# Patient Record
Sex: FEMALE | Race: WHITE | NOT HISPANIC OR LATINO | URBAN - METROPOLITAN AREA
[De-identification: names, ages, dates, MRNs, and addresses within clinical notes are randomized per-mention and may not be internally consistent; named-entity substitution may affect disease eponyms.]

---

## 2022-04-20 ENCOUNTER — OFFICE (OUTPATIENT)
Dept: URBAN - METROPOLITAN AREA CLINIC 10 | Facility: CLINIC | Age: 82
End: 2022-04-20
Payer: COMMERCIAL

## 2022-04-20 VITALS
SYSTOLIC BLOOD PRESSURE: 120 MMHG | WEIGHT: 154 LBS | OXYGEN SATURATION: 88 % | DIASTOLIC BLOOD PRESSURE: 68 MMHG | HEIGHT: 67 IN | HEART RATE: 90 BPM

## 2022-04-20 DIAGNOSIS — K51.90 ULCERATIVE COLITIS, UNSPECIFIED, WITHOUT COMPLICATIONS: ICD-10-CM

## 2022-04-20 DIAGNOSIS — D50.9 IRON DEFICIENCY ANEMIA, UNSPECIFIED: ICD-10-CM

## 2022-04-20 PROCEDURE — 99204 OFFICE O/P NEW MOD 45 MIN: CPT | Performed by: INTERNAL MEDICINE

## 2022-04-20 NOTE — SERVICEHPINOTES
Patient is 81-year-old  woman with a past medical history of  Interstitial lung disease (not on any oxygen), ulcerative colitis diagnosed over 15 years ago and considered to be in remission ( has previously only ever been on mesalamine therapy, but due to insurance issues, Lialda was discontinued and she has been on balsalazide for the last 3 years, which she takes about 3 capsules twice a day) who presents for evaluation of iron-deficiency anemia without overt bleeding. the patient has been on oral iron since January 2022, she reports repeat labs were done about 3 weeks ago.  Initial labs from December of 2021 revealed a hemoglobin of 8.5 g, MCV of 75, and ferritin of 6 with high total iron binding capacity.   Patient has not been anemic before.   angelito eaton She was previously followed by Dr. Villalobos,  and had recent colonoscopy in January of 2022 that was reportedly normal. I do not have this record for my review, and patient has also been unable to obtain her last colonoscopy report.  Patient has been following with Dr. Villalobos for the last 15 years.  
angelito eaton She does not have any overt bleeding, and has no colitis symptoms such as urgency, abdominal pain, tenesmus, or blood in the stool.  She does not report any unintentional weight loss, and does not have any upper GI symptoms such as odynophagia, dysphagia, nausea or vomiting, or acid reflux.  She does not report any unusual urinary or abnormal vaginal bleeding.

## 2022-04-26 ENCOUNTER — AMBULATORY SURGICAL CENTER (OUTPATIENT)
Dept: URBAN - METROPOLITAN AREA SURGERY 1 | Facility: SURGERY | Age: 82
End: 2022-04-26
Payer: COMMERCIAL

## 2022-04-26 ENCOUNTER — OFFICE (OUTPATIENT)
Dept: URBAN - METROPOLITAN AREA PATHOLOGY 22 | Facility: PATHOLOGY | Age: 82
End: 2022-04-26
Payer: COMMERCIAL

## 2022-04-26 VITALS
HEART RATE: 95 BPM | RESPIRATION RATE: 20 BRPM | OXYGEN SATURATION: 93 % | DIASTOLIC BLOOD PRESSURE: 89 MMHG | SYSTOLIC BLOOD PRESSURE: 158 MMHG | HEART RATE: 105 BPM | TEMPERATURE: 97.8 F | WEIGHT: 154 LBS | DIASTOLIC BLOOD PRESSURE: 83 MMHG | RESPIRATION RATE: 16 BRPM | SYSTOLIC BLOOD PRESSURE: 109 MMHG | TEMPERATURE: 97.3 F | HEART RATE: 100 BPM | HEART RATE: 103 BPM | RESPIRATION RATE: 18 BRPM | TEMPERATURE: 97.8 F | RESPIRATION RATE: 20 BRPM | WEIGHT: 154 LBS | SYSTOLIC BLOOD PRESSURE: 94 MMHG | SYSTOLIC BLOOD PRESSURE: 109 MMHG | DIASTOLIC BLOOD PRESSURE: 83 MMHG | SYSTOLIC BLOOD PRESSURE: 158 MMHG | OXYGEN SATURATION: 90 % | RESPIRATION RATE: 16 BRPM | DIASTOLIC BLOOD PRESSURE: 45 MMHG | SYSTOLIC BLOOD PRESSURE: 125 MMHG | TEMPERATURE: 97.3 F | HEART RATE: 105 BPM | DIASTOLIC BLOOD PRESSURE: 55 MMHG | DIASTOLIC BLOOD PRESSURE: 89 MMHG | RESPIRATION RATE: 18 BRPM | HEIGHT: 67 IN | SYSTOLIC BLOOD PRESSURE: 125 MMHG | OXYGEN SATURATION: 92 % | TEMPERATURE: 97.8 F | OXYGEN SATURATION: 90 % | DIASTOLIC BLOOD PRESSURE: 44 MMHG | HEART RATE: 85 BPM | HEART RATE: 95 BPM | SYSTOLIC BLOOD PRESSURE: 94 MMHG | DIASTOLIC BLOOD PRESSURE: 45 MMHG | SYSTOLIC BLOOD PRESSURE: 125 MMHG | RESPIRATION RATE: 20 BRPM | DIASTOLIC BLOOD PRESSURE: 45 MMHG | DIASTOLIC BLOOD PRESSURE: 55 MMHG | RESPIRATION RATE: 18 BRPM | HEART RATE: 100 BPM | OXYGEN SATURATION: 95 % | DIASTOLIC BLOOD PRESSURE: 83 MMHG | DIASTOLIC BLOOD PRESSURE: 44 MMHG | HEIGHT: 67 IN | SYSTOLIC BLOOD PRESSURE: 133 MMHG | HEART RATE: 95 BPM | WEIGHT: 154 LBS | OXYGEN SATURATION: 92 % | TEMPERATURE: 97.3 F | HEART RATE: 85 BPM | OXYGEN SATURATION: 90 % | SYSTOLIC BLOOD PRESSURE: 133 MMHG | HEIGHT: 67 IN | DIASTOLIC BLOOD PRESSURE: 55 MMHG | SYSTOLIC BLOOD PRESSURE: 133 MMHG | SYSTOLIC BLOOD PRESSURE: 158 MMHG | RESPIRATION RATE: 16 BRPM | HEART RATE: 100 BPM | OXYGEN SATURATION: 93 % | SYSTOLIC BLOOD PRESSURE: 94 MMHG | HEART RATE: 85 BPM | HEART RATE: 103 BPM | OXYGEN SATURATION: 93 % | OXYGEN SATURATION: 95 % | DIASTOLIC BLOOD PRESSURE: 89 MMHG | HEART RATE: 103 BPM | DIASTOLIC BLOOD PRESSURE: 44 MMHG | SYSTOLIC BLOOD PRESSURE: 109 MMHG | OXYGEN SATURATION: 95 % | HEART RATE: 105 BPM | OXYGEN SATURATION: 92 %

## 2022-04-26 DIAGNOSIS — K44.9 DIAPHRAGMATIC HERNIA WITHOUT OBSTRUCTION OR GANGRENE: ICD-10-CM

## 2022-04-26 DIAGNOSIS — K31.89 OTHER DISEASES OF STOMACH AND DUODENUM: ICD-10-CM

## 2022-04-26 DIAGNOSIS — D13.2 BENIGN NEOPLASM OF DUODENUM: ICD-10-CM

## 2022-04-26 DIAGNOSIS — D50.9 IRON DEFICIENCY ANEMIA, UNSPECIFIED: ICD-10-CM

## 2022-04-26 DIAGNOSIS — K52.9 NONINFECTIVE GASTROENTERITIS AND COLITIS, UNSPECIFIED: ICD-10-CM

## 2022-04-26 PROCEDURE — 88313 SPECIAL STAINS GROUP 2: CPT | Performed by: STUDENT IN AN ORGANIZED HEALTH CARE EDUCATION/TRAINING PROGRAM

## 2022-04-26 PROCEDURE — 43239 EGD BIOPSY SINGLE/MULTIPLE: CPT | Performed by: INTERNAL MEDICINE

## 2022-04-26 PROCEDURE — 88342 IMHCHEM/IMCYTCHM 1ST ANTB: CPT | Mod: 59 | Performed by: STUDENT IN AN ORGANIZED HEALTH CARE EDUCATION/TRAINING PROGRAM

## 2022-04-26 PROCEDURE — 88305 TISSUE EXAM BY PATHOLOGIST: CPT | Performed by: STUDENT IN AN ORGANIZED HEALTH CARE EDUCATION/TRAINING PROGRAM

## 2022-04-26 PROCEDURE — 88341 IMHCHEM/IMCYTCHM EA ADD ANTB: CPT | Performed by: STUDENT IN AN ORGANIZED HEALTH CARE EDUCATION/TRAINING PROGRAM

## 2022-04-26 PROCEDURE — G8918 PT W/O PREOP ORDER IV AB PRO: HCPCS | Performed by: INTERNAL MEDICINE

## 2022-04-26 RX ADMIN — PROPOFOL 200 MG: 10 INJECTION, EMULSION INTRAVENOUS at 11:14

## 2022-08-31 ENCOUNTER — OFFICE (OUTPATIENT)
Dept: URBAN - METROPOLITAN AREA CLINIC 10 | Facility: CLINIC | Age: 82
End: 2022-08-31

## 2022-08-31 VITALS
HEIGHT: 67 IN | DIASTOLIC BLOOD PRESSURE: 66 MMHG | OXYGEN SATURATION: 90 % | SYSTOLIC BLOOD PRESSURE: 128 MMHG | HEART RATE: 90 BPM | WEIGHT: 155 LBS

## 2022-08-31 DIAGNOSIS — K51.90 ULCERATIVE COLITIS, UNSPECIFIED, WITHOUT COMPLICATIONS: ICD-10-CM

## 2022-08-31 DIAGNOSIS — D50.9 IRON DEFICIENCY ANEMIA, UNSPECIFIED: ICD-10-CM

## 2022-08-31 LAB
CBC WITH DIFFERENTIAL/PLATELET: BASO (ABSOLUTE): 0.1 X10E3/UL (ref 0–0.2)
CBC WITH DIFFERENTIAL/PLATELET: BASOS: 1 %
CBC WITH DIFFERENTIAL/PLATELET: EOS (ABSOLUTE): 0.3 X10E3/UL (ref 0–0.4)
CBC WITH DIFFERENTIAL/PLATELET: EOS: 5 %
CBC WITH DIFFERENTIAL/PLATELET: HEMATOCRIT: 46.2 % (ref 34–46.6)
CBC WITH DIFFERENTIAL/PLATELET: HEMOGLOBIN: 14.8 G/DL (ref 11.1–15.9)
CBC WITH DIFFERENTIAL/PLATELET: IMMATURE GRANS (ABS): 0 X10E3/UL (ref 0–0.1)
CBC WITH DIFFERENTIAL/PLATELET: IMMATURE GRANULOCYTES: 1 %
CBC WITH DIFFERENTIAL/PLATELET: LYMPHS (ABSOLUTE): 1.6 X10E3/UL (ref 0.7–3.1)
CBC WITH DIFFERENTIAL/PLATELET: LYMPHS: 25 %
CBC WITH DIFFERENTIAL/PLATELET: MCH: 30.6 PG (ref 26.6–33)
CBC WITH DIFFERENTIAL/PLATELET: MCHC: 32 G/DL (ref 31.5–35.7)
CBC WITH DIFFERENTIAL/PLATELET: MCV: 96 FL (ref 79–97)
CBC WITH DIFFERENTIAL/PLATELET: MONOCYTES(ABSOLUTE): 0.4 X10E3/UL (ref 0.1–0.9)
CBC WITH DIFFERENTIAL/PLATELET: MONOCYTES: 6 %
CBC WITH DIFFERENTIAL/PLATELET: NEUTROPHILS (ABSOLUTE): 4.2 X10E3/UL (ref 1.4–7)
CBC WITH DIFFERENTIAL/PLATELET: NEUTROPHILS: 62 %
CBC WITH DIFFERENTIAL/PLATELET: PLATELETS: 338 X10E3/UL (ref 150–450)
CBC WITH DIFFERENTIAL/PLATELET: RBC: 4.84 X10E6/UL (ref 3.77–5.28)
CBC WITH DIFFERENTIAL/PLATELET: RDW: 12.6 % (ref 11.7–15.4)
CBC WITH DIFFERENTIAL/PLATELET: WBC: 6.6 X10E3/UL (ref 3.4–10.8)
FE+TIBC+FER: FERRITIN: 107 NG/ML (ref 15–150)
FE+TIBC+FER: IRON BIND.CAP.(TIBC): 300 UG/DL (ref 250–450)
FE+TIBC+FER: IRON SATURATION: 29 % (ref 15–55)
FE+TIBC+FER: IRON: 88 UG/DL (ref 27–139)
FE+TIBC+FER: UIBC: 212 UG/DL (ref 118–369)

## 2022-08-31 PROCEDURE — 99213 OFFICE O/P EST LOW 20 MIN: CPT | Performed by: INTERNAL MEDICINE

## 2022-08-31 NOTE — SERVICEHPINOTES
82-year-old  woman with a past medical history of Interstitial lung disease (not on any oxygen), ulcerative colitis diagnosed over 15 years ago and considered to be in remission ( has previously only ever been on mesalamine therapy, but due to insurance issues, Lialda was discontinued and she has been on balsalazide for the last 3 years, which she takes about 3 capsules twice a day) who presents for evaluation of iron-deficiency anemia without overt bleeding. the patient has been on oral iron since January 2022, she reports repeat labs were done about 3 weeks ago. Initial labs from December of 2021 revealed a hemoglobin of 8.5 g, MCV of 75, and ferritin of 6 with high total iron binding capacity. Patient has not been anemic before. She was previously followed by Dr. Villalobos, and had recent colonoscopy in January of 2022 that was reportedly normal. I do not have this record for my review, and patient has also been unable to obtain her last colonoscopy report. Patient has been following with Dr. Villalobos for the last 15 years. She does not have any overt bleeding, and has no colitis symptoms such as urgency, abdominal pain, tenesmus, or blood in the stool. She does not report any unintentional weight loss, and does not have any upper GI symptoms such as odynophagia, dysphagia, nausea or vomiting, or acid reflux. She does not report any unusual urinary or abnormal vaginal bleeding
br
br   8/31/22: No new symptoms  to report. She continues with oral iron twice a day.  Was not able to obtain prior recent colonoscopy report from Dr. Villalobos's office.  we discussed possibility of capsule endoscopy if patient has developed anemia despite taking oral iron twice a day, but labs from April of 2022 revealed normal hemoglobin and hematocrit..   Patient reports that she frequently forgets the nighttime dose of balsalazide.

## 2023-02-28 ENCOUNTER — OFFICE (OUTPATIENT)
Dept: URBAN - METROPOLITAN AREA CLINIC 10 | Facility: CLINIC | Age: 83
End: 2023-02-28
Payer: COMMERCIAL

## 2023-02-28 DIAGNOSIS — K51.90 ULCERATIVE COLITIS, UNSPECIFIED, WITHOUT COMPLICATIONS: ICD-10-CM

## 2023-02-28 DIAGNOSIS — D50.9 IRON DEFICIENCY ANEMIA, UNSPECIFIED: ICD-10-CM

## 2023-02-28 PROCEDURE — 99490 CHRNC CARE MGMT STAFF 1ST 20: CPT | Performed by: INTERNAL MEDICINE

## 2023-03-31 ENCOUNTER — OFFICE (OUTPATIENT)
Dept: URBAN - METROPOLITAN AREA CLINIC 10 | Facility: CLINIC | Age: 83
End: 2023-03-31

## 2023-03-31 DIAGNOSIS — D50.9 IRON DEFICIENCY ANEMIA, UNSPECIFIED: ICD-10-CM

## 2023-03-31 DIAGNOSIS — K51.90 ULCERATIVE COLITIS, UNSPECIFIED, WITHOUT COMPLICATIONS: ICD-10-CM

## 2023-03-31 PROCEDURE — 99439 CHRNC CARE MGMT STAF EA ADDL: CPT | Performed by: INTERNAL MEDICINE

## 2023-03-31 PROCEDURE — 99490 CHRNC CARE MGMT STAFF 1ST 20: CPT | Performed by: INTERNAL MEDICINE

## 2023-11-08 ENCOUNTER — OFFICE (OUTPATIENT)
Dept: URBAN - METROPOLITAN AREA CLINIC 10 | Facility: CLINIC | Age: 83
End: 2023-11-08
Payer: COMMERCIAL

## 2023-11-08 VITALS
WEIGHT: 141 LBS | HEIGHT: 67 IN | HEART RATE: 86 BPM | DIASTOLIC BLOOD PRESSURE: 58 MMHG | OXYGEN SATURATION: 92 % | SYSTOLIC BLOOD PRESSURE: 110 MMHG

## 2023-11-08 DIAGNOSIS — R19.7 DIARRHEA, UNSPECIFIED: ICD-10-CM

## 2023-11-08 DIAGNOSIS — J84.9 INTERSTITIAL PULMONARY DISEASE, UNSPECIFIED: ICD-10-CM

## 2023-11-08 DIAGNOSIS — R63.4 ABNORMAL WEIGHT LOSS: ICD-10-CM

## 2023-11-08 DIAGNOSIS — R15.2 FECAL URGENCY: ICD-10-CM

## 2023-11-08 PROCEDURE — 99214 OFFICE O/P EST MOD 30 MIN: CPT | Performed by: INTERNAL MEDICINE

## 2023-11-08 NOTE — SERVICEHPINOTES
83-year-old  woman with a past medical history of Interstitial lung disease (not on any oxygen), ulcerative colitis diagnosed over 15 years ago and considered to be in remission ( has previously only ever been on mesalamine therapy, but due to insurance issues, Lialda was discontinued and she has been on balsalazide for the last 3 years, which she takes about 3 capsules twice a day) who presents for evaluation of iron-deficiency anemia without overt bleeding. the patient has been on oral iron since January 2022, she reports repeat labs were done about 3 weeks ago. Initial labs from December of 2021 revealed a hemoglobin of 8.5 g, MCV of 75, and ferritin of 6 with high total iron binding capacity. Patient has not been anemic before.She was previously followed by Dr. Villalobos, and had recent colonoscopy in January of 2022 that was reportedly normal. I do not have this record for my review, and patient has also been unable to obtain her last colonoscopy report. Patient has been following with Dr. Villalobos for the last 15 years.She does not have any overt bleeding, and has no colitis symptoms such as urgency, abdominal pain, tenesmus, or blood in the stool. She does not report any unintentional weight loss, and does not have any upper GI symptoms such as odynophagia, dysphagia, nausea or vomiting, or acid reflux. She does not report any unusual urinary or abnormal vaginal bleeding8/31/22: No new symptoms to report. She continues with oral iron twice a day. Was not able to obtain prior recent colonoscopy report from Dr. Villalobos's office. we discussed possibility of capsule endoscopy if patient has developed anemia despite taking oral iron twice a day, but labs from April of 2022 revealed normal hemoglobin and hematocrit.. Patient reports that she frequently forgets the nighttime dose of balsalazide. 
br
br   11/8/23: Patient presents for follow-up of  bothersome fecal urgency which began after she started a new medication for interstitial lung disease. She is ow on OFEV 2 pills two times/day since 3/2023. She had COVID in 2021,  and since then, she sustained worsening of her lung function. She is now on oxygen at home and in car, typically on 5L.  Reports that her baseline oxygen saturation on room air can be well below 90%.  She is having significant nonbloody urgency after each meal.  She reports that the medication  automatically comes with another medication which is an antidiarrheal, given the known GI side effects.  Patient sees pulmonologist Dr. Velez.  Patient has also lost over 10 lb unintentionally since last office visit.  She thinks that she last had recent labs drawn at her pulmonologist's office.  She continues on balsalazide 2 tablets twice a day.  Patient finds it difficult to navigate through large supermarkets given the exertion, and shops at smaller supermarkets.   She has a dog that sheds, and often has to vacuum and mop every day,  which has also become more difficult for her.  She continues on iron 1 tablet twice a day.

## 2023-11-19 LAB
C DIFFICILE TOXINS A+B, EIA: NEGATIVE
CALPROTECTIN, FECAL: 81 UG/G (ref 0–120)
STOOL CULTURE: CAMPYLOBACTER CULTURE: (no result)
STOOL CULTURE: E COLI SHIGA TOXIN EIA: NEGATIVE
STOOL CULTURE: RESULT 1: (no result)
STOOL CULTURE: RESULT 1: (no result)
STOOL CULTURE: SALMONELLA/SHIGELLA SCREEN: (no result)